# Patient Record
Sex: FEMALE | Race: WHITE | NOT HISPANIC OR LATINO | URBAN - METROPOLITAN AREA
[De-identification: names, ages, dates, MRNs, and addresses within clinical notes are randomized per-mention and may not be internally consistent; named-entity substitution may affect disease eponyms.]

---

## 2018-02-23 ENCOUNTER — RECORDS - HEALTHEAST (OUTPATIENT)
Dept: LAB | Facility: CLINIC | Age: 83
End: 2018-02-23

## 2018-02-23 LAB
ALBUMIN UR-MCNC: NEGATIVE MG/DL
APPEARANCE UR: CLEAR
BACTERIA #/AREA URNS HPF: ABNORMAL HPF
BILIRUB UR QL STRIP: NEGATIVE
COLOR UR AUTO: YELLOW
GLUCOSE UR STRIP-MCNC: NEGATIVE MG/DL
HGB UR QL STRIP: ABNORMAL
KETONES UR STRIP-MCNC: NEGATIVE MG/DL
LEUKOCYTE ESTERASE UR QL STRIP: ABNORMAL
MUCOUS THREADS #/AREA URNS LPF: ABNORMAL LPF
NITRATE UR QL: NEGATIVE
PH UR STRIP: 5.5 [PH] (ref 4.5–8)
RBC #/AREA URNS AUTO: ABNORMAL HPF
SP GR UR STRIP: 1.02 (ref 1–1.03)
SQUAMOUS #/AREA URNS AUTO: ABNORMAL LPF
UROBILINOGEN UR STRIP-ACNC: ABNORMAL
WBC #/AREA URNS AUTO: ABNORMAL HPF

## 2018-02-25 LAB — BACTERIA SPEC CULT: NORMAL

## 2019-01-01 ENCOUNTER — OFFICE VISIT - HEALTHEAST (OUTPATIENT)
Dept: GERIATRICS | Facility: CLINIC | Age: 84
End: 2019-01-01

## 2019-01-01 ENCOUNTER — AMBULATORY - HEALTHEAST (OUTPATIENT)
Dept: HOSPICE | Facility: HOSPICE | Age: 84
End: 2019-01-01

## 2019-01-01 ENCOUNTER — RECORDS - HEALTHEAST (OUTPATIENT)
Dept: ADMINISTRATIVE | Facility: OTHER | Age: 84
End: 2019-01-01

## 2019-01-01 ENCOUNTER — HOME CARE/HOSPICE - HEALTHEAST (OUTPATIENT)
Dept: HOSPICE | Facility: HOSPICE | Age: 84
End: 2019-01-01

## 2019-01-01 ENCOUNTER — RECORDS - HEALTHEAST (OUTPATIENT)
Dept: LAB | Facility: CLINIC | Age: 84
End: 2019-01-01

## 2019-01-01 ENCOUNTER — AMBULATORY - HEALTHEAST (OUTPATIENT)
Dept: GERIATRICS | Facility: CLINIC | Age: 84
End: 2019-01-01

## 2019-01-01 DIAGNOSIS — I10 ESSENTIAL HYPERTENSION: ICD-10-CM

## 2019-01-01 DIAGNOSIS — F02.80 ALZHEIMER'S DISEASE OF OTHER ONSET WITHOUT BEHAVIORAL DISTURBANCE: ICD-10-CM

## 2019-01-01 DIAGNOSIS — R63.0 ANOREXIA: ICD-10-CM

## 2019-01-01 DIAGNOSIS — Z53.20 PATIENT REFUSES TO TAKE MEDICATION: ICD-10-CM

## 2019-01-01 DIAGNOSIS — I25.10 ASCVD (ARTERIOSCLEROTIC CARDIOVASCULAR DISEASE): ICD-10-CM

## 2019-01-01 DIAGNOSIS — R26.81 GAIT INSTABILITY: ICD-10-CM

## 2019-01-01 DIAGNOSIS — G30.8 ALZHEIMER'S DISEASE OF OTHER ONSET WITHOUT BEHAVIORAL DISTURBANCE: ICD-10-CM

## 2019-01-01 DIAGNOSIS — K21.9 GASTROESOPHAGEAL REFLUX DISEASE WITHOUT ESOPHAGITIS: ICD-10-CM

## 2019-01-01 DIAGNOSIS — R13.10 DYSPHAGIA, UNSPECIFIED TYPE: ICD-10-CM

## 2019-01-01 DIAGNOSIS — F34.1 DYSTHYMIA: ICD-10-CM

## 2019-01-01 DIAGNOSIS — R62.7 FTT (FAILURE TO THRIVE) IN ADULT: ICD-10-CM

## 2019-01-01 DIAGNOSIS — R05.9 COUGH: ICD-10-CM

## 2019-01-01 DIAGNOSIS — Z86.79 HISTORY OF HYPERTENSION: ICD-10-CM

## 2019-01-01 DIAGNOSIS — I34.0 MITRAL VALVE INSUFFICIENCY, UNSPECIFIED ETIOLOGY: ICD-10-CM
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
## 2019-01-01 DIAGNOSIS — E78.49 OTHER HYPERLIPIDEMIA: ICD-10-CM

## 2019-01-01 DIAGNOSIS — R62.51 FAILURE TO THRIVE (0-17): ICD-10-CM

## 2019-01-01 LAB
ALBUMIN UR-MCNC: ABNORMAL MG/DL
ANION GAP SERPL CALCULATED.3IONS-SCNC: 7 MMOL/L (ref 5–18)
APPEARANCE UR: ABNORMAL
BACTERIA #/AREA URNS HPF: ABNORMAL HPF
BACTERIA SPEC CULT: ABNORMAL
BILIRUB UR QL STRIP: ABNORMAL
BUN SERPL-MCNC: 15 MG/DL (ref 8–28)
CALCIUM SERPL-MCNC: 8.8 MG/DL (ref 8.5–10.5)
CHLORIDE BLD-SCNC: 105 MMOL/L (ref 98–107)
CO2 SERPL-SCNC: 26 MMOL/L (ref 22–31)
COLOR UR AUTO: ABNORMAL
CREAT SERPL-MCNC: 0.68 MG/DL (ref 0.6–1.1)
GFR SERPL CREATININE-BSD FRML MDRD: >60 ML/MIN/1.73M2
GLUCOSE BLD-MCNC: 93 MG/DL (ref 70–125)
GLUCOSE UR STRIP-MCNC: NEGATIVE MG/DL
HGB UR QL STRIP: ABNORMAL
HYALINE CASTS #/AREA URNS LPF: ABNORMAL LPF
KETONES UR STRIP-MCNC: ABNORMAL MG/DL
LEUKOCYTE ESTERASE UR QL STRIP: ABNORMAL
MUCOUS THREADS #/AREA URNS LPF: ABNORMAL LPF
NITRATE UR QL: NEGATIVE
PH UR STRIP: 5.5 [PH] (ref 4.5–8)
POTASSIUM BLD-SCNC: 4.2 MMOL/L (ref 3.5–5)
RBC #/AREA URNS AUTO: ABNORMAL HPF
SODIUM SERPL-SCNC: 138 MMOL/L (ref 136–145)
SP GR UR STRIP: 1.02 (ref 1–1.03)
SQUAMOUS #/AREA URNS AUTO: ABNORMAL LPF
UROBILINOGEN UR STRIP-ACNC: ABNORMAL
WBC #/AREA URNS AUTO: ABNORMAL HPF

## 2019-01-01 RX ORDER — ACETAMINOPHEN 500 MG
1000 TABLET ORAL 4 TIMES DAILY PRN
Status: SHIPPED | COMMUNITY
Start: 2019-01-01

## 2019-01-01 RX ORDER — HYDROMORPHONE HYDROCHLORIDE 1 MG/ML
0.5 SOLUTION ORAL EVERY 4 HOURS PRN
Status: SHIPPED | COMMUNITY
Start: 2019-01-01

## 2019-01-01 RX ORDER — HYDROMORPHONE HYDROCHLORIDE 1 MG/ML
0.5 SOLUTION ORAL AT BEDTIME
Status: SHIPPED | COMMUNITY
Start: 2019-01-01

## 2019-01-01 RX ORDER — LORAZEPAM 0.5 MG/1
0.5 TABLET ORAL 2 TIMES DAILY
Status: SHIPPED | COMMUNITY
Start: 2019-01-01

## 2019-01-01 RX ORDER — LORAZEPAM 0.5 MG/1
0.5 TABLET ORAL EVERY 4 HOURS PRN
Status: SHIPPED | COMMUNITY
Start: 2019-01-01

## 2019-01-01 ASSESSMENT — MIFFLIN-ST. JEOR: SCORE: 873.55

## 2021-05-29 NOTE — PROGRESS NOTES
[facility]    Facility:   [facility] [[id number]]   Code Status: DNR      CHIEF COMPLAINT/REASON FOR VISIT:  Chief Complaint   Patient presents with     Problem Visit     asked to see       HISTORY:      HPI: Alejandro is a 89 y.o. female who I was asked to see secondary to apparent issue of increasing falls in the past 2 weeks.  From what I heard from another staff member it was 1 or 2 falls per week for the past 2 weeks.  She does live on the memory care assisted living facility so certainly looking at her cognition as well as her insight into her own safety and using any assistive device.  I did see her the end of last week and she seemed to ambulate okay.  Staff are unable to really document her know if there is any dizziness vertigo or orthostasis problems.  For her blood pressures she is on Norvasc metoprolol as well as valsartan 320 mg.  Looking through her blood pressures lot of them have been in the 140 range but most recently 124 systolically so list at the meantime get rid of the Norvasc 5 mg and see where it goes possible get therapy involved with her gait and see if they can do some training and also to see if there is lack of insight to her safety issues.  At this juncture there is no particular issue of any other problems per se no colds flus chills or fever or hitting her head or any loss of consciousness problems.    No past medical history on file.          No family history on file.  Social History     Socioeconomic History     Marital status:      Spouse name: Not on file     Number of children: Not on file     Years of education: Not on file     Highest education level: Not on file   Occupational History     Not on file   Social Needs     Financial resource strain: Not on file     Food insecurity:     Worry: Not on file     Inability: Not on file     Transportation needs:     Medical: Not on file     Non-medical: Not on file   Tobacco Use     Smoking  status: Not on file   Substance and Sexual Activity     Alcohol use: Not on file     Drug use: Not on file     Sexual activity: Not on file   Lifestyle     Physical activity:     Days per week: Not on file     Minutes per session: Not on file     Stress: Not on file   Relationships     Social connections:     Talks on phone: Not on file     Gets together: Not on file     Attends Caodaism service: Not on file     Active member of club or organization: Not on file     Attends meetings of clubs or organizations: Not on file     Relationship status: Not on file     Intimate partner violence:     Fear of current or ex partner: Not on file     Emotionally abused: Not on file     Physically abused: Not on file     Forced sexual activity: Not on file   Other Topics Concern     Not on file   Social History Narrative     Not on file         Review of Systems  Currently no reports of fever chills fatigue cough or cold flulike symptoms nausea vomiting diarrhea dysuria unusual myalgias arthralgias stiffness rashes or sores.  History of hypertension ASCVD myocardial infarction hyperlipidemia GERD.    Current Outpatient Medications   Medication Sig     aspirin 81 mg chewable tablet Chew 81 mg daily.     citalopram (CELEXA) 20 MG tablet Take 20 mg by mouth daily.     metoprolol tartrate (LOPRESSOR) 50 MG tablet Take 50 mg by mouth 2 (two) times a day.     valsartan (DIOVAN) 320 MG tablet Take 320 mg by mouth daily.       .There were no vitals filed for this visit.  Blood pressure 124/70 pulse 84 temperature 98.0 respiration 17 saturation room air 90%  Physical Exam  Lung sounds are clear throughout cardiovascular is normal without murmurs.  No edema.  Does have cognitive impairment.  LABS:   No results found for: WBC, HGB, HCT, MCV, PLT  Results for orders placed or performed in visit on 03/12/19   Basic Metabolic Panel   Result Value Ref Range    Sodium 138 136 - 145 mmol/L    Potassium 4.2 3.5 - 5.0 mmol/L    Chloride 105 98 -  107 mmol/L    CO2 26 22 - 31 mmol/L    Anion Gap, Calculation 7 5 - 18 mmol/L    Glucose 93 70 - 125 mg/dL    Calcium 8.8 8.5 - 10.5 mg/dL    BUN 15 8 - 28 mg/dL    Creatinine 0.68 0.60 - 1.10 mg/dL    GFR MDRD Af Amer >60 >60 mL/min/1.73m2    GFR MDRD Non Af Amer >60 >60 mL/min/1.73m2           ASSESSMENT:      ICD-10-CM    1. Gait instability R26.81    2. Fall, subsequent encounter W19.XXXD    3. Essential hypertension I10        PLAN:    Discontinue the Norvasc 5 mg and have therapy involved to see if they can help her out with her gait evaluation and also insight into her chronic medical conditions and staff will keep me updated for any other problems continue to monitor the blood pressures closely.      Electronically signed by: Michael Duane Johnson, CNP

## 2021-05-29 NOTE — PROGRESS NOTES
[facility]    Facility:   [facility] [[id number]]   Code Status: DNR      CHIEF COMPLAINT/REASON FOR VISIT:  Chief Complaint   Patient presents with     Review Of Multiple Medical Conditions       HISTORY:      HPI: Alejandro is a 89 y.o. female who I was able to visit with secondary to quarterly review of chronic medical conditions.  Very nice pleasant female who I had the pleasure of visiting with once again today plus had a chance to confirm and talk to various staff members about her medications as well as how she is doing.  She otherwise has been normotensive and afebrile.  Ambulates without any assistive any assistive device.  On citalopram 20 mg for moods which has been stable.  Rarely takes a Tylenol as needed for pain.  She does participate in a number of the group activities.  We had a chance to talk about some current events or at least introduce her to some current events as well as talk about the current weather as well as the weekend coming up soon.  She does not complain of any aches or pains headaches colds or flus.    No past medical history on file.        Hypercholesterolemia hypertension GERD diastolic dysfunction, dementia, myocardial infarction, ASCVD.  No family history on file.  Social History     Socioeconomic History     Marital status:      Spouse name: Not on file     Number of children: Not on file     Years of education: Not on file     Highest education level: Not on file   Occupational History     Not on file   Social Needs     Financial resource strain: Not on file     Food insecurity:     Worry: Not on file     Inability: Not on file     Transportation needs:     Medical: Not on file     Non-medical: Not on file   Tobacco Use     Smoking status: Not on file   Substance and Sexual Activity     Alcohol use: Not on file     Drug use: Not on file     Sexual activity: Not on file   Lifestyle     Physical activity:     Days per week: Not on file      Minutes per session: Not on file     Stress: Not on file   Relationships     Social connections:     Talks on phone: Not on file     Gets together: Not on file     Attends Restorationist service: Not on file     Active member of club or organization: Not on file     Attends meetings of clubs or organizations: Not on file     Relationship status: Not on file     Intimate partner violence:     Fear of current or ex partner: Not on file     Emotionally abused: Not on file     Physically abused: Not on file     Forced sexual activity: Not on file   Other Topics Concern     Not on file   Social History Narrative     Not on file         Review of Systems  Currently no reports of fever chills fatigue cough or cold flulike symptoms nausea vomiting diarrhea dysuria unusual myalgias arthralgias stiffness rashes or sores.  History of hypertension ASCVD myocardial infarction hyperlipidemia GERD.    Current Outpatient Medications   Medication Sig     amLODIPine (NORVASC) 5 MG tablet Take 5 mg by mouth daily.     aspirin 81 mg chewable tablet Chew 81 mg daily.     citalopram (CELEXA) 20 MG tablet Take 20 mg by mouth daily.     metoprolol tartrate (LOPRESSOR) 50 MG tablet Take 50 mg by mouth 2 (two) times a day.     valsartan (DIOVAN) 320 MG tablet Take 320 mg by mouth daily.       .There were no vitals filed for this visit.  Blood pressure June 5 124/70 pulse 84 temperature 98.0 respirations 17 saturation room air 99% her weight on May 24 was 125 pounds  Physical Exam  Constitutional: No distress.   HENT:   Head: Normocephalic.   Eyes: Pupils are equal, round, and reactive to light.   Neck: Neck supple. No thyromegaly present.   Cardiovascular: Normal rate, regular rhythm and normal heart sounds.   Pulmonary/Chest: Breath sounds normal.   Abdominal: Bowel sounds are normal. There is no tenderness. There is no guarding.   Musculoskeletal:   Ambulating without any assistive device.  Denies discomfort   Neurological:   Dementia.   Skin:  Skin is warm and dry. No rash noted.   Psychiatric: Her behavior is normal.   On citalopram     LABS:   No results found for: WBC, HGB, HCT, MCV, PLT  Results for orders placed or performed in visit on 03/12/19   Basic Metabolic Panel   Result Value Ref Range    Sodium 138 136 - 145 mmol/L    Potassium 4.2 3.5 - 5.0 mmol/L    Chloride 105 98 - 107 mmol/L    CO2 26 22 - 31 mmol/L    Anion Gap, Calculation 7 5 - 18 mmol/L    Glucose 93 70 - 125 mg/dL    Calcium 8.8 8.5 - 10.5 mg/dL    BUN 15 8 - 28 mg/dL    Creatinine 0.68 0.60 - 1.10 mg/dL    GFR MDRD Af Amer >60 >60 mL/min/1.73m2    GFR MDRD Non Af Amer >60 >60 mL/min/1.73m2           ASSESSMENT:      ICD-10-CM    1. Alzheimer's disease of other onset without behavioral disturbance G30.8     F02.80    2. Essential hypertension I10    3. Dysthymia F34.1    4. Mitral valve insufficiency, unspecified etiology I34.0        PLAN:    Nice conversation otherwise unremarkable.  Continue to manage and follow her other chronic medical conditions.  She has been stable blood pressures good appetite good.  Does participate in the group activities.  No further questions or problems.      Electronically signed by: Michael Duane Johnson, CNP

## 2021-05-29 NOTE — PROGRESS NOTES
[facility]    Facility:   [facility] [[id number]]   Code Status: DNR      CHIEF COMPLAINT/REASON FOR VISIT:  Chief Complaint   Patient presents with     Problem Visit     asked to see       HISTORY:      HPI: Alejandro is a 89 y.o. female who I was asked to see secondary to hypotension as well as an apparent fall and emergency room visit on June 18, 2019.  They did send her back to her assisted living facility and she is in the memory care area.  At one point I did discontinue the low-dose amlodipine due to fears of hypotension.  The family wants to also discontinue the citalopram which I think is just fine.  She at times has good days and bad days in particular swallowing and excepting and also refusing various medications other medications generally have been erratic at times with its administration as well as variable and administration.  There has been a decline of her overall status so that was also discussed and how important it is to work with them about that I do not think she is ready for any hospice at this time no other major decline.  I think would be better served if we checked her blood pressure couple times a day for a few days to see where things are at.    No past medical history on file.          No family history on file.  Social History     Socioeconomic History     Marital status:      Spouse name: Not on file     Number of children: Not on file     Years of education: Not on file     Highest education level: Not on file   Occupational History     Not on file   Social Needs     Financial resource strain: Not on file     Food insecurity:     Worry: Not on file     Inability: Not on file     Transportation needs:     Medical: Not on file     Non-medical: Not on file   Tobacco Use     Smoking status: Not on file   Substance and Sexual Activity     Alcohol use: Not on file     Drug use: Not on file     Sexual activity: Not on file   Lifestyle     Physical  activity:     Days per week: Not on file     Minutes per session: Not on file     Stress: Not on file   Relationships     Social connections:     Talks on phone: Not on file     Gets together: Not on file     Attends Protestant service: Not on file     Active member of club or organization: Not on file     Attends meetings of clubs or organizations: Not on file     Relationship status: Not on file     Intimate partner violence:     Fear of current or ex partner: Not on file     Emotionally abused: Not on file     Physically abused: Not on file     Forced sexual activity: Not on file   Other Topics Concern     Not on file   Social History Narrative     Not on file         Review of Systems  Currently no reports of fever chills fatigue cough or cold flulike symptoms nausea vomiting diarrhea dysuria unusual myalgias arthralgias stiffness rashes or sores.  History of hypertension ASCVD myocardial infarction hyperlipidemia GERD.      Current Outpatient Medications:      aspirin 81 mg chewable tablet, Chew 81 mg daily., Disp: , Rfl:      metoprolol tartrate (LOPRESSOR) 50 MG tablet, Take 50 mg by mouth 2 (two) times a day., Disp: , Rfl:      valsartan (DIOVAN) 320 MG tablet, Take 320 mg by mouth daily., Disp: , Rfl:     .There were no vitals filed for this visit.  Heart rate 72 respirations 18  Physical Exam  Lung sounds are clear throughout cardiovascular is normal without murmurs.  LABS:   No results found for: WBC, HGB, HCT, MCV, PLT  Results for orders placed or performed in visit on 03/12/19   Basic Metabolic Panel   Result Value Ref Range    Sodium 138 136 - 145 mmol/L    Potassium 4.2 3.5 - 5.0 mmol/L    Chloride 105 98 - 107 mmol/L    CO2 26 22 - 31 mmol/L    Anion Gap, Calculation 7 5 - 18 mmol/L    Glucose 93 70 - 125 mg/dL    Calcium 8.8 8.5 - 10.5 mg/dL    BUN 15 8 - 28 mg/dL    Creatinine 0.68 0.60 - 1.10 mg/dL    GFR MDRD Af Amer >60 >60 mL/min/1.73m2    GFR MDRD Non Af Amer >60 >60 mL/min/1.73m2            ASSESSMENT:      ICD-10-CM    1. Essential hypertension I10    2. Alzheimer's disease of other onset without behavioral disturbance G30.8     F02.80    3. Dysthymia F34.1        PLAN:    Discontinue the citalopram due to family request.  Otherwise continue to monitor the blood pressures we will also do blood pressure checks twice a day for a few days and then see if we can manage that otherwise she has had slow decline in overall status but does not yet qualify for hospice we will keep a close eye on that continue to communicate with family.      Electronically signed by: Michael Duane Johnson, CNP

## 2021-05-30 NOTE — PROGRESS NOTES
[facility]    Facility:   [facility] [[id number]]   Code Status: DNR      CHIEF COMPLAINT/REASON FOR VISIT:  Chief Complaint   Patient presents with     Problem Visit     asked to see       HISTORY:      HPI: Aleajndro is a 89 y.o. female who I was asked to see not only by staff but her  secondary to progressive decline with her overall health status along with her dementia decreased ability to sufficiently and consistently swallow liquid or solid products.  There is been times where she has not been able to take her valsartan Norvasc or metoprolol although her blood pressures have been in the 140s over 70 range.  She has lost some weight but they do not take regular vital signs on the unit staff have noticed her to cough at times I did do a chest x-ray yesterday which was negative for any infiltrates.  Due to also some of the cognitive issues I did do a urinalysis which still waiting for the culture to come back but it very well could be a urinary tract infection as well but beyond the urinary tract infection she has had a progressive decline in talking with the  we did talk about the goals of care and perhaps that moving into a long-term care facility rather than assisted living facility will give her more options and benefits and care along with even with the hope of putting her on hospice care within the near future.  He is not opposed to that at this time.  She does need assistance with all ADLs and does require assistance with even basic cares.  She at this point unable to really answer sufficiently any questions.  About a week or 2 ago the family did want to decline the citalopram and the aspirin.    No past medical history on file.          No family history on file.  Social History     Socioeconomic History     Marital status:      Spouse name: Not on file     Number of children: Not on file     Years of education: Not on file     Highest education level:  Not on file   Occupational History     Not on file   Social Needs     Financial resource strain: Not on file     Food insecurity:     Worry: Not on file     Inability: Not on file     Transportation needs:     Medical: Not on file     Non-medical: Not on file   Tobacco Use     Smoking status: Not on file   Substance and Sexual Activity     Alcohol use: Not on file     Drug use: Not on file     Sexual activity: Not on file   Lifestyle     Physical activity:     Days per week: Not on file     Minutes per session: Not on file     Stress: Not on file   Relationships     Social connections:     Talks on phone: Not on file     Gets together: Not on file     Attends Restorationist service: Not on file     Active member of club or organization: Not on file     Attends meetings of clubs or organizations: Not on file     Relationship status: Not on file     Intimate partner violence:     Fear of current or ex partner: Not on file     Emotionally abused: Not on file     Physically abused: Not on file     Forced sexual activity: Not on file   Other Topics Concern     Not on file   Social History Narrative     Not on file         Review of Systems  Due to her dementia did require staff assistance but there have been no colds flus chills fever wheezing rashes headache stiff neck or swollen glands.    Current Outpatient Medications   Medication Sig     amLODIPine (NORVASC) 5 MG tablet Take 5 mg by mouth daily.     metoprolol tartrate (LOPRESSOR) 50 MG tablet Take 50 mg by mouth 2 (two) times a day.     valsartan (DIOVAN) 320 MG tablet Take 320 mg by mouth daily.       .There were no vitals filed for this visit.  Blood pressure 141/74 pulse 94 temperature 97.8  Physical Exam  Lungs are clear throughout cardiovascular is normal without murmurs.  No edema.  Has dementia.  LABS:   No results found for: WBC, HGB, HCT, MCV, PLT  Results for orders placed or performed in visit on 03/12/19   Basic Metabolic Panel   Result Value Ref Range     Sodium 138 136 - 145 mmol/L    Potassium 4.2 3.5 - 5.0 mmol/L    Chloride 105 98 - 107 mmol/L    CO2 26 22 - 31 mmol/L    Anion Gap, Calculation 7 5 - 18 mmol/L    Glucose 93 70 - 125 mg/dL    Calcium 8.8 8.5 - 10.5 mg/dL    BUN 15 8 - 28 mg/dL    Creatinine 0.68 0.60 - 1.10 mg/dL    GFR MDRD Af Amer >60 >60 mL/min/1.73m2    GFR MDRD Non Af Amer >60 >60 mL/min/1.73m2           ASSESSMENT:      ICD-10-CM    1. Essential hypertension I10    2. Alzheimer's disease of other onset without behavioral disturbance G30.8     F02.80    3. Dysphagia, unspecified type R13.10    4. Cough R05        PLAN:    I am still awaiting the urine culture.  Otherwise did have a pleasant conversation today with the spouse or her  and we will keep a close eye or the next few days certainly treat if it is urinary tract infection but because of the swallowing and the overall decline talk to staff as well about potentially working her way into a long-term care memory care unit or even talking hospice.      Electronically signed by: Michael Duane Johnson, CNP

## 2021-05-30 NOTE — PROGRESS NOTES
[facility]    Facility:   [facility] [[id number]]   Code Status: DNR      CHIEF COMPLAINT/REASON FOR VISIT:  Chief Complaint   Patient presents with     Problem Visit     asked to see       HISTORY:      HPI: Alejandro is a 89 y.o. female who I was asked to see secondary to at this point not swallowing or taking any of her medications.  Looking through her MAR I was not told about any of this in the past week or so other than she had difficulty swallowing medication and then for the most part the staff is able to crush it and give her her blood pressure medications but now at this point she is not even taking her blood pressure medications and her blood pressure is pretty much of been less than 120 systolically I did speak with her  little over week ago perhaps even 10 days ago about the decline that I have seen the staff have seen and but his opinion was and now at this point it does appear that she is now headed for hospice care and so I had a chance to at least address this with staff.  The family was not here to discuss any of this today although the daughter does want pastoral support and care as well.  She recently was treated for UTI.  She once again and is currently still asymptomatic and afebrile.  Taking minimal sips taking minimal solid intake.  She is currently on the assisted living facility memory care unit.  Vital signs and weights are not regularly performed although they do recently did do some blood pressure checks on her.  She does appear to be thin.  She really is unable to care for herself and does need assistance with all ADLs.  After talking further with staff we will go ahead and set up hospice consult so they can talk to family about the overall decline in the progressive decline.  I will also discontinue all her blood pressure medications.  She does not appear to be in any discomfort.  No past medical history on file.          No family history on  file.  Social History     Socioeconomic History     Marital status:      Spouse name: Not on file     Number of children: Not on file     Years of education: Not on file     Highest education level: Not on file   Occupational History     Not on file   Social Needs     Financial resource strain: Not on file     Food insecurity:     Worry: Not on file     Inability: Not on file     Transportation needs:     Medical: Not on file     Non-medical: Not on file   Tobacco Use     Smoking status: Not on file   Substance and Sexual Activity     Alcohol use: Not on file     Drug use: Not on file     Sexual activity: Not on file   Lifestyle     Physical activity:     Days per week: Not on file     Minutes per session: Not on file     Stress: Not on file   Relationships     Social connections:     Talks on phone: Not on file     Gets together: Not on file     Attends Worship service: Not on file     Active member of club or organization: Not on file     Attends meetings of clubs or organizations: Not on file     Relationship status: Not on file     Intimate partner violence:     Fear of current or ex partner: Not on file     Emotionally abused: Not on file     Physically abused: Not on file     Forced sexual activity: Not on file   Other Topics Concern     Not on file   Social History Narrative     Not on file         Review of Systems  Due to her dementia did require staff assistance but there have been no colds flus chills fever wheezing rashes headache stiff neck or swollen glands.  Primarily is been refusal of fluids fluids and medication.  .There were no vitals filed for this visit.  Blood pressure 120/68 heart rate is 78  Physical Exam  Lungs are clear cardiovascular is normal without murmurs.  No edema.  No rashes or sores unusual aches or pains to palpation of her major joints.  Does have dementia.  LABS:   No results found for: WBC, HGB, HCT, MCV, PLT  Results for orders placed or performed in visit on 03/12/19    Basic Metabolic Panel   Result Value Ref Range    Sodium 138 136 - 145 mmol/L    Potassium 4.2 3.5 - 5.0 mmol/L    Chloride 105 98 - 107 mmol/L    CO2 26 22 - 31 mmol/L    Anion Gap, Calculation 7 5 - 18 mmol/L    Glucose 93 70 - 125 mg/dL    Calcium 8.8 8.5 - 10.5 mg/dL    BUN 15 8 - 28 mg/dL    Creatinine 0.68 0.60 - 1.10 mg/dL    GFR MDRD Af Amer >60 >60 mL/min/1.73m2    GFR MDRD Non Af Amer >60 >60 mL/min/1.73m2       No results found for: LABPROT, ALBUMIN      ASSESSMENT:      ICD-10-CM    1. FTT (failure to thrive) in adult R62.7    2. Patient refuses to take medication Z53.20    3. Essential hypertension I10    4. Anorexia R63.0        PLAN:    I will send over the face sheet for the hospice team to at least come in contact with the family to discuss hospice care.  This is also discussed with staff members.  They agree and are on board for this particular plan at this time.  Discontinue her blood pressure medications.      Electronically signed by: Michael Duane Johnson, CNP

## 2021-05-30 NOTE — PROGRESS NOTES
"[facility]    Facility:   [facility] [[id number]]   Code Status: DNR/DNI    Chief concern: Patient is seen by me today for admission to the long-term care facility.    Reason for admission: Patient is seen by me, for admission to the long-term care facility transferring here from the adjacent assisted living memory care unit.  She was admitted to the facility under hospice care, due to a significant decrease in her eating.  She is also having more difficulty with swallowing medication.  I met with resident while her  was visiting with her in the room.  The primary concern of patient's  is that she needs to have between meal, and nighttime nutritional supplement drink ordered.  I promised him I would do this.  He told me that this has been his wife's main source of nutrition for the last several weeks.  Otherwise, no acute concerns expressed by her .  Patient had no acute concerns to tell me.  She denied having any discomfort, nausea, or abdominal pain.  When I asked her what foods were her favorite, she told me she likes almost everything.  She denies having any problems with eating.  She also denied having any recent problems with breathing.  Facility staff had no acute concerns for me to address.    Review of systems: See history of present illness, all others negative.     The medication list from the nursing home orders in the [facility] EMR, and the medications listed in the Epic EMR were reconciled, and any new medications to include in Epic are listed as a \"historical medication\".    Current Outpatient Medications   Medication Sig Dispense Refill     acetaminophen (TYLENOL) 500 MG tablet Take 1,000 mg by mouth 4 (four) times a day as needed for pain.             HYDROmorphone (DILAUDID) 1 mg/mL Liqd solution Place 0.5 mg under the tongue at bedtime. Indications: Pain       HYDROmorphone (DILAUDID) 1 mg/mL Liqd solution Place 0.5 mg under the " tongue every 4 (four) hours as needed (pain or SOB). Indications: Pain, SOB       LORazepam (ATIVAN) 0.5 MG tablet Take 0.5 mg by mouth 2 (two) times a day. Indications: anxious       LORazepam (ATIVAN) 0.5 MG tablet Take 0.5 mg by mouth every 4 (four) hours as needed for anxiety. Indications: anxious       No current facility-administered medications for this visit.      No past medical history on file.   No past surgical history on file.   Social History     Tobacco Use     Smoking status: Not on file   Substance Use Topics     Alcohol use: Not on file     Drug use: Not on file        No family history on file.    Vitals:    07/17/19 1119   BP: 118/67   Pulse: 72   Resp: 16   Temp: 98.1  F (36.7  C)   SpO2: 94%       EXAM:   General: Vital signs reviewed.  Patient is in no acute appearing distress.  Breathing appears nonlabored.  Patient is alert and recognizes and interacts with her  in the room.  She does not speak unless asked question, and when she responds, she is pleasant and gives a short answer and clear fluent speech.  HEENT exam: No scleral discoloration.  No abnormal ear or nasal discharge.  Lips appear moist.  Intraoral exam shows no abnormal plaques, no vesicles or ulcers noted.  No pharyngeal injection noted.  Neck: Supple with no JVD.  Heart: Heart rate is regular without murmur.  Lungs: Lungs are clear to auscultation with good airflow bilaterally.  Abdomen:  Abdomen is soft, nontender.  No palpable abnormal masses or organomegaly.  Skin/extremities: Warm and dry, with no ankle edema noted.  Neuro: No acute focal abnormalities.  Psych: Patient has pleasant affect at time of exam.  She is minimally conversant, leading her  to most of the talking during exam.  No hallucinatory statements.    Recent studies: No recent studies.  She was treated for urinary tract infection this past June.  This past March, her renal function and electrolytes were assessed, which were good.    Approximately  45minutes was spent on patient management, with greater than 50% of this time being spent on discussion of concerns and management with resident, her spouse, and care staff.  The remainder of the time was spent on medication and vital sign data reconciliation between electronic medical records, and review of past medical history and current medical management.    Assessment/Plan   1. Failure to thrive (0-17)     2. Alzheimer's disease of other onset without behavioral disturbance     3. History of hypertension         Patient Instructions   We will start three times a day Ensure nutritional supplements.  Her antihypertensive medication was recently discontinued, and her blood pressure seems to be normal after this.  No new studies needed at this time.  Continue with current management under hospice care.  I will write an order for her acetaminophen to be dosed 1000 mg 3 times daily rather than 4 times daily, to reduce her possible daily total dose from 4000 mg to 3000 mg daily.     Misha Ramos, DO

## 2021-05-30 NOTE — PATIENT INSTRUCTIONS - HE
We will start three times a day Ensure nutritional supplements.  Her antihypertensive medication was recently discontinued, and her blood pressure seems to be normal after this.  No new studies needed at this time.  Continue with current management under hospice care.  I will write an order for her acetaminophen to be dosed 1000 mg 3 times daily rather than 4 times daily, to reduce her possible daily total dose from 4000 mg to 3000 mg daily.

## 2021-06-03 VITALS — WEIGHT: 109 LBS | HEIGHT: 63 IN | BODY MASS INDEX: 19.31 KG/M2

## 2021-06-18 NOTE — LETTER
Letter by Johnson, Michael Duane, CNP at      Author: Johnson, Michael Duane, CNP Service: -- Author Type: --    Filed:  Encounter Date: 3/8/2019 Status: (Other)         Patient: Alejandro Galvin   MR Number: [id number]   YOB: 1928   Date of Visit: 3/8/2019     [facility]      Facility:    [facility] [[id number]]  Code Status: DNR      Chief Complaint/Reason for Visit:  Chief Complaint   Patient presents with   ? H & P       HPI:   Alejandro is a 89 y.o. female who is a new patient at the [facility] memory care unit.  Had the pleasure of visiting with her.  She does have a history of dementia.  She did have an episode of delirium last March 2018 with some weight loss she initially did start on hospice but did recover and no longer requiring hospice care.  She does have a history of gait instability and stumbling along with a history of falls otherwise currently not a major issue.  She has had a history of hypertension currently being treated.  Most of the information received today is from the spared records that she has but also from the nursing staff.  In talking with the patient I did have a chance to address some of the health concerns that I am aware of including her current medications including Celexa for depression along with blood pressure medications.  She denies any pain.  I did talk about the assisted living memory care unit and she says that she does not stay there that she is they are visiting somebody.  I also did observe her as she does do activities with other staff members including some exercises and exercise group today.  She did workout or at least walk regularly with her .  Staff do not feel there is any emotional or depression issues at this time.  She apparently has been normotensive and afebrile and also on air.  There is been no pain she does have Tylenol as needed.    Past Medical History:  No past medical  history on file.      Hypercholesterolemia hypertension GERD diastolic dysfunction dementia myocardial infarction ASCVD.  Surgical History:  No past surgical history on file.  None that I am aware of  Family History:   No family history on file.  Unable to determine due to lack of records.  Social History:    Social History     Socioeconomic History   ? Marital status:      Spouse name: Not on file   ? Number of children: Not on file   ? Years of education: Not on file   ? Highest education level: Not on file   Occupational History   ? Not on file   Social Needs   ? Financial resource strain: Not on file   ? Food insecurity:     Worry: Not on file     Inability: Not on file   ? Transportation needs:     Medical: Not on file     Non-medical: Not on file   Tobacco Use   ? Smoking status: Not on file   Substance and Sexual Activity   ? Alcohol use: Not on file   ? Drug use: Not on file   ? Sexual activity: Not on file   Lifestyle   ? Physical activity:     Days per week: Not on file     Minutes per session: Not on file   ? Stress: Not on file   Relationships   ? Social connections:     Talks on phone: Not on file     Gets together: Not on file     Attends [employer] service: Not on file     Active member of club or organization: Not on file     Attends meetings of clubs or organizations: Not on file     Relationship status: Not on file   ? Intimate partner violence:     Fear of current or ex partner: Not on file     Emotionally abused: Not on file     Physically abused: Not on file     Forced sexual activity: Not on file   Other Topics Concern   ? Not on file   Social History Narrative   ? Not on file          Review of Systems  Currently no reports of fever chills fatigue cough or cold flulike symptoms nausea vomiting diarrhea dysuria unusual myalgias arthralgias stiffness rashes or sores.  History of hypertension ASCVD myocardial infarction hyperlipidemia GERD.  There were no vitals filed for this visit.  Blood  pressure 137/65 pulse 63 respirations 16 temperature 97.5 saturation room air 94% weight 130 pounds  Physical Exam   Constitutional: No distress.   HENT:   Head: Normocephalic.   Eyes: Pupils are equal, round, and reactive to light.   Neck: Neck supple. No thyromegaly present.   Cardiovascular: Normal rate, regular rhythm and normal heart sounds.   Pulmonary/Chest: Breath sounds normal.   Abdominal: Bowel sounds are normal. There is no tenderness. There is no guarding.   Musculoskeletal:   Ambulating without any assistive device.  Denies discomfort   Lymphadenopathy:     She has no cervical adenopathy.   Neurological:   Dementia.   Skin: Skin is warm and dry. No rash noted.   Psychiatric: Her behavior is normal.   On citalopram       Medication List:  Current Outpatient Medications   Medication Sig   ? amLODIPine (NORVASC) 5 MG tablet Take 5 mg by mouth daily.   ? aspirin 81 mg chewable tablet Chew 81 mg daily.   ? citalopram (CELEXA) 20 MG tablet Take 20 mg by mouth daily.   ? metoprolol tartrate (LOPRESSOR) 50 MG tablet Take 50 mg by mouth 2 (two) times a day.   ? valsartan (DIOVAN) 320 MG tablet Take 320 mg by mouth daily.       Labs:  No results found for: WBC, HGB, HCT, MCV, PLT    Assessment:    ICD-10-CM    1. Alzheimer's disease of other onset without behavioral disturbance G30.8     F02.80    2. Essential hypertension I10    3. Dysthymia F34.1    4. Gastroesophageal reflux disease without esophagitis K21.9    5. Other hyperlipidemia E78.49    6. Mitral valve insufficiency, unspecified etiology I34.0    7. ASCVD (arteriosclerotic cardiovascular disease) I25.10        Plan:  No Medications are changed to during this visit today.  She seemed to be doing quite well overall.  She seems to be acclimating well to the assisted living facility memory care floor.  She is participating in group activities.  I think at this point I do not have any laboratory studies probably be safe to add and a BMP next week so that  at least we have some information because she is on blood pressure medications and then after that probably recheck yearly.    For documentation purposes total visit 30 minutes which over 50% was spent with the patient going over her past medical history and just sitting down in talking with her for the remainder of the time was spent with staff members trying to procure information and discuss quality of care and plan of care issues.          Electronically signed by: Michael Duane Johnson, CNP

## 2021-06-19 NOTE — LETTER
Letter by Johnson, Michael Duane, CNP at      Author: Johnson, Michael Duane, CNP Service: -- Author Type: --    Filed:  Encounter Date: 7/9/2019 Status: (Other)         Patient: Alejandro Galvin   MR Number: [id number]   YOB: 1928   Date of Visit: 7/9/2019     [facility]    Facility:   [facility] [[id number]]   Code Status: DNR      CHIEF COMPLAINT/REASON FOR VISIT:  Chief Complaint   Patient presents with   ? Problem Visit     asked to see       HISTORY:      HPI: Alejandro is a 89 y.o. female who I was asked to see secondary to at this point not swallowing or taking any of her medications.  Looking through her MAR I was not told about any of this in the past week or so other than she had difficulty swallowing medication and then for the most part the staff is able to crush it and give her her blood pressure medications but now at this point she is not even taking her blood pressure medications and her blood pressure is pretty much of been less than 120 systolically I did speak with her  little over week ago perhaps even 10 days ago about the decline that I have seen the staff have seen and but his opinion was and now at this point it does appear that she is now headed for hospice care and so I had a chance to at least address this with staff.  The family was not here to discuss any of this today although the daughter does want pastoral support and care as well.  She recently was treated for UTI.  She once again and is currently still asymptomatic and afebrile.  Taking minimal sips taking minimal solid intake.  She is currently on the assisted living facility memory care unit.  Vital signs and weights are not regularly performed although they do recently did do some blood pressure checks on her.  She does appear to be thin.  She really is unable to care for herself and does need assistance with all ADLs.  After talking further with staff we will go ahead and  set up hospice consult so they can talk to family about the overall decline in the progressive decline.  I will also discontinue all her blood pressure medications.  She does not appear to be in any discomfort.  No past medical history on file.          No family history on file.  Social History     Socioeconomic History   ? Marital status:      Spouse name: Not on file   ? Number of children: Not on file   ? Years of education: Not on file   ? Highest education level: Not on file   Occupational History   ? Not on file   Social Needs   ? Financial resource strain: Not on file   ? Food insecurity:     Worry: Not on file     Inability: Not on file   ? Transportation needs:     Medical: Not on file     Non-medical: Not on file   Tobacco Use   ? Smoking status: Not on file   Substance and Sexual Activity   ? Alcohol use: Not on file   ? Drug use: Not on file   ? Sexual activity: Not on file   Lifestyle   ? Physical activity:     Days per week: Not on file     Minutes per session: Not on file   ? Stress: Not on file   Relationships   ? Social connections:     Talks on phone: Not on file     Gets together: Not on file     Attends [employer] service: Not on file     Active member of club or organization: Not on file     Attends meetings of clubs or organizations: Not on file     Relationship status: Not on file   ? Intimate partner violence:     Fear of current or ex partner: Not on file     Emotionally abused: Not on file     Physically abused: Not on file     Forced sexual activity: Not on file   Other Topics Concern   ? Not on file   Social History Narrative   ? Not on file         Review of Systems  Due to her dementia did require staff assistance but there have been no colds flus chills fever wheezing rashes headache stiff neck or swollen glands.  Primarily is been refusal of fluids fluids and medication.  .There were no vitals filed for this visit.  Blood pressure 120/68 heart rate is 78  Physical Exam  Lungs  are clear cardiovascular is normal without murmurs.  No edema.  No rashes or sores unusual aches or pains to palpation of her major joints.  Does have dementia.  LABS:   No results found for: WBC, HGB, HCT, MCV, PLT  Results for orders placed or performed in visit on 03/12/19   Basic Metabolic Panel   Result Value Ref Range    Sodium 138 136 - 145 mmol/L    Potassium 4.2 3.5 - 5.0 mmol/L    Chloride 105 98 - 107 mmol/L    CO2 26 22 - 31 mmol/L    Anion Gap, Calculation 7 5 - 18 mmol/L    Glucose 93 70 - 125 mg/dL    Calcium 8.8 8.5 - 10.5 mg/dL    BUN 15 8 - 28 mg/dL    Creatinine 0.68 0.60 - 1.10 mg/dL    GFR MDRD Af Amer >60 >60 mL/min/1.73m2    GFR MDRD Non Af Amer >60 >60 mL/min/1.73m2       No results found for: LABPROT, ALBUMIN      ASSESSMENT:      ICD-10-CM    1. FTT (failure to thrive) in adult R62.7    2. Patient refuses to take medication Z53.20    3. Essential hypertension I10    4. Anorexia R63.0        PLAN:    I will send over the face sheet for the hospice team to at least come in contact with the family to discuss hospice care.  This is also discussed with staff members.  They agree and are on board for this particular plan at this time.  Discontinue her blood pressure medications.      Electronically signed by: Michael Duane Johnson, AVE

## 2021-06-19 NOTE — LETTER
Letter by Johnson, Michael Duane, CNP at      Author: Johnson, Michael Duane, CNP Service: -- Author Type: --    Filed:  Encounter Date: 6/7/2019 Status: (Other)         Patient: Alejandro Galvin   MR Number: [id number]   YOB: 1928   Date of Visit: 6/7/2019     [facility]    Facility:   [facility] [[id number]]   Code Status: DNR      CHIEF COMPLAINT/REASON FOR VISIT:  Chief Complaint   Patient presents with   ? Review Of Multiple Medical Conditions       HISTORY:      HPI: Alejandro is a 89 y.o. female who I was able to visit with secondary to quarterly review of chronic medical conditions.  Very nice pleasant female who I had the pleasure of visiting with once again today plus had a chance to confirm and talk to various staff members about her medications as well as how she is doing.  She otherwise has been normotensive and afebrile.  Ambulates without any assistive any assistive device.  On citalopram 20 mg for moods which has been stable.  Rarely takes a Tylenol as needed for pain.  She does participate in a number of the group activities.  We had a chance to talk about some current events or at least introduce her to some current events as well as talk about the current weather as well as the weekend coming up soon.  She does not complain of any aches or pains headaches colds or flus.    No past medical history on file.        Hypercholesterolemia hypertension GERD diastolic dysfunction, dementia, myocardial infarction, ASCVD.  No family history on file.  Social History     Socioeconomic History   ? Marital status:      Spouse name: Not on file   ? Number of children: Not on file   ? Years of education: Not on file   ? Highest education level: Not on file   Occupational History   ? Not on file   Social Needs   ? Financial resource strain: Not on file   ? Food insecurity:     Worry: Not on file     Inability: Not on file   ? Transportation needs:     Medical:  Not on file     Non-medical: Not on file   Tobacco Use   ? Smoking status: Not on file   Substance and Sexual Activity   ? Alcohol use: Not on file   ? Drug use: Not on file   ? Sexual activity: Not on file   Lifestyle   ? Physical activity:     Days per week: Not on file     Minutes per session: Not on file   ? Stress: Not on file   Relationships   ? Social connections:     Talks on phone: Not on file     Gets together: Not on file     Attends Jain service: Not on file     Active member of club or organization: Not on file     Attends meetings of clubs or organizations: Not on file     Relationship status: Not on file   ? Intimate partner violence:     Fear of current or ex partner: Not on file     Emotionally abused: Not on file     Physically abused: Not on file     Forced sexual activity: Not on file   Other Topics Concern   ? Not on file   Social History Narrative   ? Not on file         Review of Systems  Currently no reports of fever chills fatigue cough or cold flulike symptoms nausea vomiting diarrhea dysuria unusual myalgias arthralgias stiffness rashes or sores.  History of hypertension ASCVD myocardial infarction hyperlipidemia GERD.    Current Outpatient Medications   Medication Sig   ? amLODIPine (NORVASC) 5 MG tablet Take 5 mg by mouth daily.   ? aspirin 81 mg chewable tablet Chew 81 mg daily.   ? citalopram (CELEXA) 20 MG tablet Take 20 mg by mouth daily.   ? metoprolol tartrate (LOPRESSOR) 50 MG tablet Take 50 mg by mouth 2 (two) times a day.   ? valsartan (DIOVAN) 320 MG tablet Take 320 mg by mouth daily.       .There were no vitals filed for this visit.  Blood pressure June 5 124/70 pulse 84 temperature 98.0 respirations 17 saturation room air 99% her weight on May 24 was 125 pounds  Physical Exam  Constitutional: No distress.   HENT:   Head: Normocephalic.   Eyes: Pupils are equal, round, and reactive to light.   Neck: Neck supple. No thyromegaly present.   Cardiovascular: Normal rate,  regular rhythm and normal heart sounds.   Pulmonary/Chest: Breath sounds normal.   Abdominal: Bowel sounds are normal. There is no tenderness. There is no guarding.   Musculoskeletal:   Ambulating without any assistive device.  Denies discomfort   Neurological:   Dementia.   Skin: Skin is warm and dry. No rash noted.   Psychiatric: Her behavior is normal.   On citalopram     LABS:   No results found for: WBC, HGB, HCT, MCV, PLT  Results for orders placed or performed in visit on 03/12/19   Basic Metabolic Panel   Result Value Ref Range    Sodium 138 136 - 145 mmol/L    Potassium 4.2 3.5 - 5.0 mmol/L    Chloride 105 98 - 107 mmol/L    CO2 26 22 - 31 mmol/L    Anion Gap, Calculation 7 5 - 18 mmol/L    Glucose 93 70 - 125 mg/dL    Calcium 8.8 8.5 - 10.5 mg/dL    BUN 15 8 - 28 mg/dL    Creatinine 0.68 0.60 - 1.10 mg/dL    GFR MDRD Af Amer >60 >60 mL/min/1.73m2    GFR MDRD Non Af Amer >60 >60 mL/min/1.73m2           ASSESSMENT:      ICD-10-CM    1. Alzheimer's disease of other onset without behavioral disturbance G30.8     F02.80    2. Essential hypertension I10    3. Dysthymia F34.1    4. Mitral valve insufficiency, unspecified etiology I34.0        PLAN:    Nice conversation otherwise unremarkable.  Continue to manage and follow her other chronic medical conditions.  She has been stable blood pressures good appetite good.  Does participate in the group activities.  No further questions or problems.      Electronically signed by: Michael Duane Johnson, AVE

## 2021-06-19 NOTE — LETTER
Letter by Johnson, Michael Duane, CNP at      Author: Johnson, Michael Duane, CNP Service: -- Author Type: --    Filed:  Encounter Date: 6/27/2019 Status: (Other)         Patient: Alejandro Galvin   MR Number: [id number]   YOB: 1928   Date of Visit: 6/27/2019     [facility]    Facility:   [facility] [[id number]]   Code Status: DNR      CHIEF COMPLAINT/REASON FOR VISIT:  Chief Complaint   Patient presents with   ? Problem Visit     asked to see       HISTORY:      HPI: Alejandro is a 89 y.o. female who I was asked to see not only by staff but her  secondary to progressive decline with her overall health status along with her dementia decreased ability to sufficiently and consistently swallow liquid or solid products.  There is been times where she has not been able to take her valsartan Norvasc or metoprolol although her blood pressures have been in the 140s over 70 range.  She has lost some weight but they do not take regular vital signs on the unit staff have noticed her to cough at times I did do a chest x-ray yesterday which was negative for any infiltrates.  Due to also some of the cognitive issues I did do a urinalysis which still waiting for the culture to come back but it very well could be a urinary tract infection as well but beyond the urinary tract infection she has had a progressive decline in talking with the  we did talk about the goals of care and perhaps that moving into a long-term care facility rather than assisted living facility will give her more options and benefits and care along with even with the hope of putting her on hospice care within the near future.  He is not opposed to that at this time.  She does need assistance with all ADLs and does require assistance with even basic cares.  She at this point unable to really answer sufficiently any questions.  About a week or 2 ago the family did want to decline the citalopram and the  aspirin.    No past medical history on file.          No family history on file.  Social History     Socioeconomic History   ? Marital status:      Spouse name: Not on file   ? Number of children: Not on file   ? Years of education: Not on file   ? Highest education level: Not on file   Occupational History   ? Not on file   Social Needs   ? Financial resource strain: Not on file   ? Food insecurity:     Worry: Not on file     Inability: Not on file   ? Transportation needs:     Medical: Not on file     Non-medical: Not on file   Tobacco Use   ? Smoking status: Not on file   Substance and Sexual Activity   ? Alcohol use: Not on file   ? Drug use: Not on file   ? Sexual activity: Not on file   Lifestyle   ? Physical activity:     Days per week: Not on file     Minutes per session: Not on file   ? Stress: Not on file   Relationships   ? Social connections:     Talks on phone: Not on file     Gets together: Not on file     Attends Hindu service: Not on file     Active member of club or organization: Not on file     Attends meetings of clubs or organizations: Not on file     Relationship status: Not on file   ? Intimate partner violence:     Fear of current or ex partner: Not on file     Emotionally abused: Not on file     Physically abused: Not on file     Forced sexual activity: Not on file   Other Topics Concern   ? Not on file   Social History Narrative   ? Not on file         Review of Systems  Due to her dementia did require staff assistance but there have been no colds flus chills fever wheezing rashes headache stiff neck or swollen glands.    Current Outpatient Medications   Medication Sig   ? amLODIPine (NORVASC) 5 MG tablet Take 5 mg by mouth daily.   ? metoprolol tartrate (LOPRESSOR) 50 MG tablet Take 50 mg by mouth 2 (two) times a day.   ? valsartan (DIOVAN) 320 MG tablet Take 320 mg by mouth daily.       .There were no vitals filed for this visit.  Blood pressure 141/74 pulse 94 temperature  97.8  Physical Exam  Lungs are clear throughout cardiovascular is normal without murmurs.  No edema.  Has dementia.  LABS:   No results found for: WBC, HGB, HCT, MCV, PLT  Results for orders placed or performed in visit on 03/12/19   Basic Metabolic Panel   Result Value Ref Range    Sodium 138 136 - 145 mmol/L    Potassium 4.2 3.5 - 5.0 mmol/L    Chloride 105 98 - 107 mmol/L    CO2 26 22 - 31 mmol/L    Anion Gap, Calculation 7 5 - 18 mmol/L    Glucose 93 70 - 125 mg/dL    Calcium 8.8 8.5 - 10.5 mg/dL    BUN 15 8 - 28 mg/dL    Creatinine 0.68 0.60 - 1.10 mg/dL    GFR MDRD Af Amer >60 >60 mL/min/1.73m2    GFR MDRD Non Af Amer >60 >60 mL/min/1.73m2           ASSESSMENT:      ICD-10-CM    1. Essential hypertension I10    2. Alzheimer's disease of other onset without behavioral disturbance G30.8     F02.80    3. Dysphagia, unspecified type R13.10    4. Cough R05        PLAN:    I am still awaiting the urine culture.  Otherwise did have a pleasant conversation today with the spouse or her  and we will keep a close eye or the next few days certainly treat if it is urinary tract infection but because of the swallowing and the overall decline talk to staff as well about potentially working her way into a long-term care memory care unit or even talking hospice.      Electronically signed by: Michael Duane Johnson, CNP

## 2021-06-19 NOTE — LETTER
"Letter by Misha Ramos DO at      Author: Misha Ramos DO Service: -- Author Type: --    Filed:  Encounter Date: 7/17/2019 Status: (Other)         Patient: Alejandro Galvin   MR Number: [id number]   YOB: 1928   Date of Visit: 7/17/2019     [facility]    Facility:   [facility] [[id number]]   Code Status: DNR/DNI    Chief concern: Patient is seen by me today for admission to the long-term care facility.    Reason for admission: Patient is seen by me, for admission to the long-term care facility transferring here from the adjacent assisted living memory care unit.  She was admitted to the facility under hospice care, due to a significant decrease in her eating.  She is also having more difficulty with swallowing medication.  I met with resident while her  was visiting with her in the room.  The primary concern of patient's  is that she needs to have between meal, and nighttime nutritional supplement drink ordered.  I promised him I would do this.  He told me that this has been his wife's main source of nutrition for the last several weeks.  Otherwise, no acute concerns expressed by her .  Patient had no acute concerns to tell me.  She denied having any discomfort, nausea, or abdominal pain.  When I asked her what foods were her favorite, she told me she likes almost everything.  She denies having any problems with eating.  She also denied having any recent problems with breathing.  Facility staff had no acute concerns for me to address.    Review of systems: See history of present illness, all others negative.     The medication list from the nursing home orders in the [facility] EMR, and the medications listed in the Epic EMR were reconciled, and any new medications to include in Epic are listed as a \"historical medication\".    Current Outpatient Medications   Medication Sig Dispense Refill   ? acetaminophen (TYLENOL) 500 MG tablet " Take 1,000 mg by mouth 4 (four) times a day as needed for pain.           ? HYDROmorphone (DILAUDID) 1 mg/mL Liqd solution Place 0.5 mg under the tongue at bedtime. Indications: Pain     ? HYDROmorphone (DILAUDID) 1 mg/mL Liqd solution Place 0.5 mg under the tongue every 4 (four) hours as needed (pain or SOB). Indications: Pain, SOB     ? LORazepam (ATIVAN) 0.5 MG tablet Take 0.5 mg by mouth 2 (two) times a day. Indications: anxious     ? LORazepam (ATIVAN) 0.5 MG tablet Take 0.5 mg by mouth every 4 (four) hours as needed for anxiety. Indications: anxious       No current facility-administered medications for this visit.      No past medical history on file.   No past surgical history on file.   Social History     Tobacco Use   ? Smoking status: Not on file   Substance Use Topics   ? Alcohol use: Not on file   ? Drug use: Not on file        No family history on file.    Vitals:    07/17/19 1119   BP: 118/67   Pulse: 72   Resp: 16   Temp: 98.1  F (36.7  C)   SpO2: 94%       EXAM:   General: Vital signs reviewed.  Patient is in no acute appearing distress.  Breathing appears nonlabored.  Patient is alert and recognizes and interacts with her  in the room.  She does not speak unless asked question, and when she responds, she is pleasant and gives a short answer and clear fluent speech.  HEENT exam: No scleral discoloration.  No abnormal ear or nasal discharge.  Lips appear moist.  Intraoral exam shows no abnormal plaques, no vesicles or ulcers noted.  No pharyngeal injection noted.  Neck: Supple with no JVD.  Heart: Heart rate is regular without murmur.  Lungs: Lungs are clear to auscultation with good airflow bilaterally.  Abdomen:  Abdomen is soft, nontender.  No palpable abnormal masses or organomegaly.  Skin/extremities: Warm and dry, with no ankle edema noted.  Neuro: No acute focal abnormalities.  Psych: Patient has pleasant affect at time of exam.  She is minimally conversant, leading her  to most  of the talking during exam.  No hallucinatory statements.    Recent studies: No recent studies.  She was treated for urinary tract infection this past June.  This past March, her renal function and electrolytes were assessed, which were good.    Approximately 45minutes was spent on patient management, with greater than 50% of this time being spent on discussion of concerns and management with resident, her spouse, and care staff.  The remainder of the time was spent on medication and vital sign data reconciliation between electronic medical records, and review of past medical history and current medical management.    Assessment/Plan   1. Failure to thrive (0-17)     2. Alzheimer's disease of other onset without behavioral disturbance     3. History of hypertension         Patient Instructions   We will start three times a day Ensure nutritional supplements.  Her antihypertensive medication was recently discontinued, and her blood pressure seems to be normal after this.  No new studies needed at this time.  Continue with current management under hospice care.  I will write an order for her acetaminophen to be dosed 1000 mg 3 times daily rather than 4 times daily, to reduce her possible daily total dose from 4000 mg to 3000 mg daily.     Misha Ramos, DO

## 2021-06-19 NOTE — LETTER
Letter by Johnson, Michael Duane, CNP at      Author: Johnson, Michael Duane, CNP Service: -- Author Type: --    Filed:  Encounter Date: 6/11/2019 Status: (Other)         Patient: Alejandro Galvin   MR Number: [id number]   YOB: 1928   Date of Visit: 6/11/2019     [facility]    Facility:   [facility] [[id number]]   Code Status: DNR      CHIEF COMPLAINT/REASON FOR VISIT:  Chief Complaint   Patient presents with   ? Problem Visit     asked to see       HISTORY:      HPI: Alejandro is a 89 y.o. female who I was asked to see secondary to apparent issue of increasing falls in the past 2 weeks.  From what I heard from another staff member it was 1 or 2 falls per week for the past 2 weeks.  She does live on the memory care assisted living facility so certainly looking at her cognition as well as her insight into her own safety and using any assistive device.  I did see her the end of last week and she seemed to ambulate okay.  Staff are unable to really document her know if there is any dizziness vertigo or orthostasis problems.  For her blood pressures she is on Norvasc metoprolol as well as valsartan 320 mg.  Looking through her blood pressures lot of them have been in the 140 range but most recently 124 systolically so list at the meantime get rid of the Norvasc 5 mg and see where it goes possible get therapy involved with her gait and see if they can do some training and also to see if there is lack of insight to her safety issues.  At this juncture there is no particular issue of any other problems per se no colds flus chills or fever or hitting her head or any loss of consciousness problems.    No past medical history on file.          No family history on file.  Social History     Socioeconomic History   ? Marital status:      Spouse name: Not on file   ? Number of children: Not on file   ? Years of education: Not on file   ? Highest education level: Not on file    Occupational History   ? Not on file   Social Needs   ? Financial resource strain: Not on file   ? Food insecurity:     Worry: Not on file     Inability: Not on file   ? Transportation needs:     Medical: Not on file     Non-medical: Not on file   Tobacco Use   ? Smoking status: Not on file   Substance and Sexual Activity   ? Alcohol use: Not on file   ? Drug use: Not on file   ? Sexual activity: Not on file   Lifestyle   ? Physical activity:     Days per week: Not on file     Minutes per session: Not on file   ? Stress: Not on file   Relationships   ? Social connections:     Talks on phone: Not on file     Gets together: Not on file     Attends Yazidi service: Not on file     Active member of club or organization: Not on file     Attends meetings of clubs or organizations: Not on file     Relationship status: Not on file   ? Intimate partner violence:     Fear of current or ex partner: Not on file     Emotionally abused: Not on file     Physically abused: Not on file     Forced sexual activity: Not on file   Other Topics Concern   ? Not on file   Social History Narrative   ? Not on file         Review of Systems  Currently no reports of fever chills fatigue cough or cold flulike symptoms nausea vomiting diarrhea dysuria unusual myalgias arthralgias stiffness rashes or sores.  History of hypertension ASCVD myocardial infarction hyperlipidemia GERD.    Current Outpatient Medications   Medication Sig   ? aspirin 81 mg chewable tablet Chew 81 mg daily.   ? citalopram (CELEXA) 20 MG tablet Take 20 mg by mouth daily.   ? metoprolol tartrate (LOPRESSOR) 50 MG tablet Take 50 mg by mouth 2 (two) times a day.   ? valsartan (DIOVAN) 320 MG tablet Take 320 mg by mouth daily.       .There were no vitals filed for this visit.  Blood pressure 124/70 pulse 84 temperature 98.0 respiration 17 saturation room air 90%  Physical Exam  Lung sounds are clear throughout cardiovascular is normal without murmurs.  No edema.  Does have  cognitive impairment.  LABS:   No results found for: WBC, HGB, HCT, MCV, PLT  Results for orders placed or performed in visit on 03/12/19   Basic Metabolic Panel   Result Value Ref Range    Sodium 138 136 - 145 mmol/L    Potassium 4.2 3.5 - 5.0 mmol/L    Chloride 105 98 - 107 mmol/L    CO2 26 22 - 31 mmol/L    Anion Gap, Calculation 7 5 - 18 mmol/L    Glucose 93 70 - 125 mg/dL    Calcium 8.8 8.5 - 10.5 mg/dL    BUN 15 8 - 28 mg/dL    Creatinine 0.68 0.60 - 1.10 mg/dL    GFR MDRD Af Amer >60 >60 mL/min/1.73m2    GFR MDRD Non Af Amer >60 >60 mL/min/1.73m2           ASSESSMENT:      ICD-10-CM    1. Gait instability R26.81    2. Fall, subsequent encounter W19.XXXD    3. Essential hypertension I10        PLAN:    Discontinue the Norvasc 5 mg and have therapy involved to see if they can help her out with her gait evaluation and also insight into her chronic medical conditions and staff will keep me updated for any other problems continue to monitor the blood pressures closely.      Electronically signed by: Michael Duane Johnson, AVE

## 2021-06-19 NOTE — LETTER
Letter by Johnson, Michael Duane, CNP at      Author: Johnson, Michael Duane, CNP Service: -- Author Type: --    Filed:  Encounter Date: 6/20/2019 Status: (Other)         Patient: Alejandro Galvin   MR Number: [id number]   YOB: 1928   Date of Visit: 6/20/2019     [facility]    Facility:   [facility] [[id number]]   Code Status: DNR      CHIEF COMPLAINT/REASON FOR VISIT:  Chief Complaint   Patient presents with   ? Problem Visit     asked to see       HISTORY:      HPI: Alejandro is a 89 y.o. female who I was asked to see secondary to hypotension as well as an apparent fall and emergency room visit on June 18, 2019.  They did send her back to her assisted living facility and she is in the memory care area.  At one point I did discontinue the low-dose amlodipine due to fears of hypotension.  The family wants to also discontinue the citalopram which I think is just fine.  She at times has good days and bad days in particular swallowing and excepting and also refusing various medications other medications generally have been erratic at times with its administration as well as variable and administration.  There has been a decline of her overall status so that was also discussed and how important it is to work with them about that I do not think she is ready for any hospice at this time no other major decline.  I think would be better served if we checked her blood pressure couple times a day for a few days to see where things are at.    No past medical history on file.          No family history on file.  Social History     Socioeconomic History   ? Marital status:      Spouse name: Not on file   ? Number of children: Not on file   ? Years of education: Not on file   ? Highest education level: Not on file   Occupational History   ? Not on file   Social Needs   ? Financial resource strain: Not on file   ? Food insecurity:     Worry: Not on file     Inability: Not on  file   ? Transportation needs:     Medical: Not on file     Non-medical: Not on file   Tobacco Use   ? Smoking status: Not on file   Substance and Sexual Activity   ? Alcohol use: Not on file   ? Drug use: Not on file   ? Sexual activity: Not on file   Lifestyle   ? Physical activity:     Days per week: Not on file     Minutes per session: Not on file   ? Stress: Not on file   Relationships   ? Social connections:     Talks on phone: Not on file     Gets together: Not on file     Attends [employer] service: Not on file     Active member of club or organization: Not on file     Attends meetings of clubs or organizations: Not on file     Relationship status: Not on file   ? Intimate partner violence:     Fear of current or ex partner: Not on file     Emotionally abused: Not on file     Physically abused: Not on file     Forced sexual activity: Not on file   Other Topics Concern   ? Not on file   Social History Narrative   ? Not on file         Review of Systems  Currently no reports of fever chills fatigue cough or cold flulike symptoms nausea vomiting diarrhea dysuria unusual myalgias arthralgias stiffness rashes or sores.  History of hypertension ASCVD myocardial infarction hyperlipidemia GERD.      Current Outpatient Medications:   ?  aspirin 81 mg chewable tablet, Chew 81 mg daily., Disp: , Rfl:   ?  metoprolol tartrate (LOPRESSOR) 50 MG tablet, Take 50 mg by mouth 2 (two) times a day., Disp: , Rfl:   ?  valsartan (DIOVAN) 320 MG tablet, Take 320 mg by mouth daily., Disp: , Rfl:     .There were no vitals filed for this visit.  Heart rate 72 respirations 18  Physical Exam  Lung sounds are clear throughout cardiovascular is normal without murmurs.  LABS:   No results found for: WBC, HGB, HCT, MCV, PLT  Results for orders placed or performed in visit on 03/12/19   Basic Metabolic Panel   Result Value Ref Range    Sodium 138 136 - 145 mmol/L    Potassium 4.2 3.5 - 5.0 mmol/L    Chloride 105 98 - 107 mmol/L    CO2 26 22  - 31 mmol/L    Anion Gap, Calculation 7 5 - 18 mmol/L    Glucose 93 70 - 125 mg/dL    Calcium 8.8 8.5 - 10.5 mg/dL    BUN 15 8 - 28 mg/dL    Creatinine 0.68 0.60 - 1.10 mg/dL    GFR MDRD Af Amer >60 >60 mL/min/1.73m2    GFR MDRD Non Af Amer >60 >60 mL/min/1.73m2           ASSESSMENT:      ICD-10-CM    1. Essential hypertension I10    2. Alzheimer's disease of other onset without behavioral disturbance G30.8     F02.80    3. Dysthymia F34.1        PLAN:    Discontinue the citalopram due to family request.  Otherwise continue to monitor the blood pressures we will also do blood pressure checks twice a day for a few days and then see if we can manage that otherwise she has had slow decline in overall status but does not yet qualify for hospice we will keep a close eye on that continue to communicate with family.      Electronically signed by: Michael Duane Johnson, CNP

## 2021-06-24 NOTE — PROGRESS NOTES
[facility]      Facility:    [facility] [[id number]]  Code Status: DNR      Chief Complaint/Reason for Visit:  Chief Complaint   Patient presents with     H & P       HPI:   Alejandro is a 89 y.o. female who is a new patient at the [facility] memory care unit.  Had the pleasure of visiting with her.  She does have a history of dementia.  She did have an episode of delirium last March 2018 with some weight loss she initially did start on hospice but did recover and no longer requiring hospice care.  She does have a history of gait instability and stumbling along with a history of falls otherwise currently not a major issue.  She has had a history of hypertension currently being treated.  Most of the information received today is from the spared records that she has but also from the nursing staff.  In talking with the patient I did have a chance to address some of the health concerns that I am aware of including her current medications including Celexa for depression along with blood pressure medications.  She denies any pain.  I did talk about the assisted living memory care unit and she says that she does not stay there that she is they are visiting somebody.  I also did observe her as she does do activities with other staff members including some exercises and exercise group today.  She did workout or at least walk regularly with her .  Staff do not feel there is any emotional or depression issues at this time.  She apparently has been normotensive and afebrile and also on air.  There is been no pain she does have Tylenol as needed.    Past Medical History:  No past medical history on file.      Hypercholesterolemia hypertension GERD diastolic dysfunction dementia myocardial infarction ASCVD.  Surgical History:  No past surgical history on file.  None that I am aware of  Family History:   No family history on file.  Unable to determine due to lack of  records.  Social History:    Social History     Socioeconomic History     Marital status:      Spouse name: Not on file     Number of children: Not on file     Years of education: Not on file     Highest education level: Not on file   Occupational History     Not on file   Social Needs     Financial resource strain: Not on file     Food insecurity:     Worry: Not on file     Inability: Not on file     Transportation needs:     Medical: Not on file     Non-medical: Not on file   Tobacco Use     Smoking status: Not on file   Substance and Sexual Activity     Alcohol use: Not on file     Drug use: Not on file     Sexual activity: Not on file   Lifestyle     Physical activity:     Days per week: Not on file     Minutes per session: Not on file     Stress: Not on file   Relationships     Social connections:     Talks on phone: Not on file     Gets together: Not on file     Attends Zoroast[employer] service: Not on file     Active member of club or organization: Not on file     Attends meetings of clubs or organizations: Not on file     Relationship status: Not on file     Intimate partner violence:     Fear of current or ex partner: Not on file     Emotionally abused: Not on file     Physically abused: Not on file     Forced sexual activity: Not on file   Other Topics Concern     Not on file   Social History Narrative     Not on file          Review of Systems  Currently no reports of fever chills fatigue cough or cold flulike symptoms nausea vomiting diarrhea dysuria unusual myalgias arthralgias stiffness rashes or sores.  History of hypertension ASCVD myocardial infarction hyperlipidemia GERD.  There were no vitals filed for this visit.  Blood pressure 137/65 pulse 63 respirations 16 temperature 97.5 saturation room air 94% weight 130 pounds  Physical Exam   Constitutional: No distress.   HENT:   Head: Normocephalic.   Eyes: Pupils are equal, round, and reactive to light.   Neck: Neck supple. No thyromegaly present.    Cardiovascular: Normal rate, regular rhythm and normal heart sounds.   Pulmonary/Chest: Breath sounds normal.   Abdominal: Bowel sounds are normal. There is no tenderness. There is no guarding.   Musculoskeletal:   Ambulating without any assistive device.  Denies discomfort   Lymphadenopathy:     She has no cervical adenopathy.   Neurological:   Dementia.   Skin: Skin is warm and dry. No rash noted.   Psychiatric: Her behavior is normal.   On citalopram       Medication List:  Current Outpatient Medications   Medication Sig     amLODIPine (NORVASC) 5 MG tablet Take 5 mg by mouth daily.     aspirin 81 mg chewable tablet Chew 81 mg daily.     citalopram (CELEXA) 20 MG tablet Take 20 mg by mouth daily.     metoprolol tartrate (LOPRESSOR) 50 MG tablet Take 50 mg by mouth 2 (two) times a day.     valsartan (DIOVAN) 320 MG tablet Take 320 mg by mouth daily.       Labs:  No results found for: WBC, HGB, HCT, MCV, PLT    Assessment:    ICD-10-CM    1. Alzheimer's disease of other onset without behavioral disturbance G30.8     F02.80    2. Essential hypertension I10    3. Dysthymia F34.1    4. Gastroesophageal reflux disease without esophagitis K21.9    5. Other hyperlipidemia E78.49    6. Mitral valve insufficiency, unspecified etiology I34.0    7. ASCVD (arteriosclerotic cardiovascular disease) I25.10        Plan:  No Medications are changed to during this visit today.  She seemed to be doing quite well overall.  She seems to be acclimating well to the assisted living facility memory care floor.  She is participating in group activities.  I think at this point I do not have any laboratory studies probably be safe to add and a BMP next week so that at least we have some information because she is on blood pressure medications and then after that probably recheck yearly.    For documentation purposes total visit 30 minutes which over 50% was spent with the patient going over her past medical history and just sitting down in  talking with her for the remainder of the time was spent with staff members trying to procure information and discuss quality of care and plan of care issues.          Electronically signed by: Michael Duane Johnson, CNP